# Patient Record
Sex: MALE | Race: WHITE | NOT HISPANIC OR LATINO | Employment: FULL TIME | URBAN - METROPOLITAN AREA
[De-identification: names, ages, dates, MRNs, and addresses within clinical notes are randomized per-mention and may not be internally consistent; named-entity substitution may affect disease eponyms.]

---

## 2019-08-26 ENCOUNTER — HOSPITAL ENCOUNTER (EMERGENCY)
Facility: HOSPITAL | Age: 59
Discharge: HOME | End: 2019-08-26
Attending: EMERGENCY MEDICINE
Payer: MEDICAID

## 2019-08-26 VITALS
WEIGHT: 230 LBS | OXYGEN SATURATION: 98 % | SYSTOLIC BLOOD PRESSURE: 149 MMHG | RESPIRATION RATE: 16 BRPM | HEART RATE: 60 BPM | DIASTOLIC BLOOD PRESSURE: 75 MMHG | TEMPERATURE: 97 F | HEIGHT: 71 IN | BODY MASS INDEX: 32.2 KG/M2

## 2019-08-26 DIAGNOSIS — R07.9 CHEST PAIN, UNSPECIFIED TYPE: Primary | ICD-10-CM

## 2019-08-26 DIAGNOSIS — R07.9 CHEST PAIN: ICD-10-CM

## 2019-08-26 DIAGNOSIS — R42 DIZZINESS: ICD-10-CM

## 2019-08-26 LAB
ALBUMIN SERPL-MCNC: 3.2 G/DL (ref 3.3–5.5)
ALP SERPL-CCNC: 76 U/L (ref 42–141)
BILIRUB SERPL-MCNC: 0.4 MG/DL (ref 0.2–1.6)
BUN SERPL-MCNC: 21 MG/DL (ref 7–22)
CALCIUM SERPL-MCNC: 8.9 MG/DL (ref 8–10.3)
CHLORIDE SERPL-SCNC: 105 MMOL/L (ref 98–108)
CREAT SERPL-MCNC: 1 MG/DL (ref 0.6–1.2)
GLUCOSE SERPL-MCNC: 182 MG/DL (ref 73–118)
POC ALT (SGPT): 23 U/L (ref 10–47)
POC AST (SGOT): 21 U/L (ref 11–38)
POC B-TYPE NATRIURETIC PEPTIDE: 41.6 PG/ML (ref 0–100)
POC CARDIAC TROPONIN I: 0 NG/ML
POC D-DI: 419 NG/ML (ref 0–450)
POC PTINR: 1 (ref 0.9–1.2)
POC PTWBT: 12.2 SEC (ref 9.7–14.3)
POC TCO2: 27 MMOL/L (ref 18–33)
POTASSIUM BLD-SCNC: 4.6 MMOL/L (ref 3.6–5.1)
PROTEIN, POC: 6.8 G/DL (ref 6.4–8.1)
SAMPLE: NORMAL
SAMPLE: NORMAL
SODIUM BLD-SCNC: 142 MMOL/L (ref 128–145)

## 2019-08-26 PROCEDURE — 93005 ELECTROCARDIOGRAM TRACING: CPT | Mod: ER

## 2019-08-26 PROCEDURE — 25500020 PHARM REV CODE 255: Mod: ER

## 2019-08-26 PROCEDURE — 25000003 PHARM REV CODE 250: Mod: ER | Performed by: EMERGENCY MEDICINE

## 2019-08-26 PROCEDURE — 80053 COMPREHEN METABOLIC PANEL: CPT | Mod: ER

## 2019-08-26 PROCEDURE — 85379 FIBRIN DEGRADATION QUANT: CPT | Mod: ER

## 2019-08-26 PROCEDURE — 85610 PROTHROMBIN TIME: CPT | Mod: ER

## 2019-08-26 PROCEDURE — 93010 ELECTROCARDIOGRAM REPORT: CPT | Mod: ,,, | Performed by: INTERNAL MEDICINE

## 2019-08-26 PROCEDURE — 99285 EMERGENCY DEPT VISIT HI MDM: CPT | Mod: 25,ER

## 2019-08-26 PROCEDURE — 83880 ASSAY OF NATRIURETIC PEPTIDE: CPT | Mod: ER

## 2019-08-26 PROCEDURE — 93010 EKG 12-LEAD: ICD-10-PCS | Mod: 76,,, | Performed by: INTERNAL MEDICINE

## 2019-08-26 PROCEDURE — 84484 ASSAY OF TROPONIN QUANT: CPT | Mod: ER

## 2019-08-26 PROCEDURE — 85025 COMPLETE CBC W/AUTO DIFF WBC: CPT | Mod: ER

## 2019-08-26 RX ORDER — ASPIRIN 325 MG
325 TABLET ORAL
Status: COMPLETED | OUTPATIENT
Start: 2019-08-26 | End: 2019-08-26

## 2019-08-26 RX ORDER — ASPIRIN 325 MG
325 TABLET ORAL
Status: DISCONTINUED | OUTPATIENT
Start: 2019-08-26 | End: 2019-08-26

## 2019-08-26 RX ADMIN — ASPIRIN 325 MG ORAL TABLET 325 MG: 325 PILL ORAL at 09:08

## 2019-08-26 RX ADMIN — IOHEXOL 75 ML: 350 INJECTION, SOLUTION INTRAVENOUS at 11:08

## 2019-08-26 NOTE — ED NOTES
Pt. Asking what do we think is going on.  Will ask the doctor to step back into the room to talk with the pt.

## 2019-08-26 NOTE — ED PROVIDER NOTES
"Encounter Date: 8/26/2019    SCRIBE #1 NOTE: I, Hector Richards, am scribing for, and in the presence of,  Dr. Valenzuela. I have scribed the following portions of the note - Other sections scribed: HPI, ROS, PE.       History     Chief Complaint   Patient presents with    Chest Pain     pt c/o chest pain for 3 days getting worse. states radiates to the right shoulder. pt c/o nausea and lightheadedness that gets worse when he moves around.      David Cantu is a 58 year old male with DM and thyroid problems who presents to the ED complaining of chest pain radiating to the right chest for 3 days.  He reports that the pain has been constant and is unrelated to rest or exertion.  He reports shortness of breath with it that was worse when he woke up last night.  He denies any lower extremity edema.  He does report that he recently traveled to [place].  He denies any other history of prolonged immobilization.  He reports that chest pain is not positional.  He denies any history of similar prior symptoms.  Patient also reports bilateral arm numbness that started two days ago. He reports that is not necessarily associated with the chest pain. He denies any focal weakness or dizziness. He does report that he has been having bilateral blurry vision for 3 days and now he is seeing "spots".  Patient also states that he is not having "equilibrium" when standing.  He denies any falls.  Patient took an 81 mg of aspirin today.  He denies any exacerbating or alleviating factors.    The history is provided by the patient. No  was used.     Review of patient's allergies indicates:  No Known Allergies  Past Medical History:   Diagnosis Date    Arthritis     Diabetes mellitus, type 2     Thyroid disease      Past Surgical History:   Procedure Laterality Date    COSMETIC SURGERY      FRACTURE SURGERY      HAND SURGERY      knee surgery       History reviewed. No pertinent family history.  Social History "     Tobacco Use    Smoking status: Current Every Day Smoker     Packs/day: 0.50     Years: 30.00     Pack years: 15.00     Types: Cigarettes   Substance Use Topics    Alcohol use: Yes    Drug use: No     Review of Systems   Constitutional: Negative for chills and fever.   HENT: Negative for congestion, sore throat and trouble swallowing.    Eyes: Positive for visual disturbance.   Respiratory: Negative for cough and shortness of breath.    Cardiovascular: Positive for chest pain. Negative for palpitations and leg swelling.   Gastrointestinal: Negative for abdominal pain, diarrhea, nausea and vomiting.   Musculoskeletal: Negative for back pain and neck pain.   Neurological: Positive for numbness (resolved). Negative for weakness and headaches.   All other systems reviewed and are negative.      Physical Exam     Initial Vitals [08/26/19 0919]   BP Pulse Resp Temp SpO2   (!) 157/83 73 18 98.1 °F (36.7 °C) 100 %      MAP       --         Physical Exam    Nursing note and vitals reviewed.  Constitutional: He appears well-developed and well-nourished.   HENT:   Head: Normocephalic and atraumatic.   Mouth/Throat: Oropharynx is clear and moist.   Eyes: Conjunctivae and EOM are normal. Pupils are equal, round, and reactive to light.   Neck: Normal range of motion. Neck supple.   Cardiovascular: Normal rate, regular rhythm, normal heart sounds and intact distal pulses.   No murmur heard.  Pulmonary/Chest: Breath sounds normal. He has no wheezes.   Abdominal: Soft. Bowel sounds are normal. He exhibits no distension.   Musculoskeletal: Normal range of motion. He exhibits no edema or tenderness.   Neurological: He is alert and oriented to person, place, and time. He has normal strength and normal reflexes. No cranial nerve deficit or sensory deficit. GCS eye subscore is 4. GCS verbal subscore is 5. GCS motor subscore is 6.   Normal finger to nose.   Skin: Skin is warm and dry. Capillary refill takes less than 2 seconds. No  rash noted.   Psychiatric: He has a normal mood and affect. His behavior is normal.         ED Course   Procedures  Labs Reviewed   POCT CMP - Abnormal; Notable for the following components:       Result Value    POC Glucose 182 (*)     All other components within normal limits   TROPONIN ISTAT   POCT CBC   POCT CMP   POCT TROPONIN   POCT B-TYPE NATRIURETIC PEPTIDE (BNP)   POCT PROTIME-INR   POCT D DIMER   ISTAT PROCEDURE   POCT B-TYPE NATRIURETIC PEPTIDE (BNP)   POCT D DIMER     EKG Readings: (Independently Interpreted)   Time: 8/26/19 9:16  Rate: 72 bpm  Normal sinus rhythm. No ST or T-wave abnormalities.  No prior EKGs available to compare to.         Other EKG Interpretations: Time: 8/26/2019 12:16  Rate 55 beats per minute.  Sinus bradycardia.  No ST or T-wave abnormality.  Unchanged from prior EKG.     ECG Results          EKG 12-lead (In process)  Result time 08/26/19 09:27:10    In process by Interface, [facility] (08/26/19 09:27:10)                 Narrative:    Test Reason : R07.9,    Vent. Rate : 072 BPM     Atrial Rate : 072 BPM     P-R Int : 146 ms          QRS Dur : 078 ms      QT Int : 368 ms       P-R-T Axes : 067 069 074 degrees     QTc Int : 402 ms    Normal sinus rhythm  Normal ECG  No previous ECGs available    Referred By:             Confirmed By:                             Imaging Results          CTA Chest Non-Coronary (PE Study) (Final result)  Result time 08/26/19 11:53:17    Final result by Nicole Cordero MD (08/26/19 11:53:17)                 Impression:      No evidence of pulmonary embolus.    Bronchial wall thickening and mild bilateral perihilar lymphadenopathy.  These findings suggest bronchitis.    Three lung nodules ranging in size from 5-6 mm.  For multiple solid nodules with any 6 mm or greater, Fleischner Society guidelines recommend follow up with non-contrast chest CT at 3-6 months and 18-24 months after discovery.    Enlarged thyroid.      Electronically signed  by: Nicole Cordero MD  Date:    08/26/2019  Time:    11:53             Narrative:    EXAMINATION:  CTA CHEST NON CORONARY    CLINICAL HISTORY:  Chest pain, acute, nonspecific, low prob CAD;    TECHNIQUE:  Low dose axial images, sagittal and coronal reformations were obtained from the thoracic inlet to the lung bases following the IV administration of 75 mL of Omnipaque 350.  Contrast timing was optimized to evaluate the pulmonary arteries.  MIP images were performed.    COMPARISON:  Chest PA and lateral same date    FINDINGS:  The pulmonary artery and its branches are patent and unremarkable.  The thoracic aorta is normal in caliber and unremarkable.  The heart is unremarkable.  No pericardial fluid.    No mediastinal lymphadenopathy.  There is mild bilateral perihilar lymphadenopathy.  There is thickening of the perihilar bronchial walls.    There are 2 nodules in the lower lobe of the left lung.  One nodule measures 6 mm and the other measures 5 mm.  There is a 6 mm nodule in the lower lobe of the right lung.  The bilateral lungs are otherwise unremarkable.  No pneumothorax or pleural fluid.    The visualized abdominal structures are unremarkable.  There is degenerative change of the spine.  The osseous structures are otherwise unremarkable.    The thyroid is enlarged.                               CT Head Without Contrast (Final result)  Result time 08/26/19 10:15:59    Final result by Og Alex MD (08/26/19 10:15:59)                 Impression:      No acute abnormality.  Follow-up as clinically indicated.      Electronically signed by: Og Alex MD  Date:    08/26/2019  Time:    10:15             Narrative:    EXAMINATION:  CT HEAD WITHOUT CONTRAST    CLINICAL HISTORY:  Dizziness;    TECHNIQUE:  Low dose axial CT images obtained throughout the head without intravenous contrast. Sagittal and coronal reconstructions were performed.    COMPARISON:  None.    FINDINGS:  Intracranial  compartment:    Ventricles and sulci are normal in size for age without evidence of hydrocephalus. No extra-axial blood or fluid collections.    The brain parenchyma appears normal. No parenchymal mass, hemorrhage, edema or major vascular distribution infarct.    Skull/extracranial contents (limited evaluation): No fracture. Mastoid air cells and paranasal sinuses are essentially clear.                                X-Ray Chest PA And Lateral (Final result)  Result time 08/26/19 10:15:16    Final result by Petr Mcgee MD (08/26/19 10:15:16)                 Impression:      Mild pleural thickening along the lateral margin of the right chest could represent a small amount of pleural fluid or pleural based process.  Follow-up radiograph recommended to ensure resolution.    This report was flagged in Epic as abnormal.      Electronically signed by: Petr Mcgee MD  Date:    08/26/2019  Time:    10:15             Narrative:    EXAMINATION:  XR CHEST PA AND LATERAL    CLINICAL HISTORY:  Chest pain, unspecified    TECHNIQUE:  PA and lateral views of the chest were performed.    COMPARISON:  None    FINDINGS:  Mild pleural thickening along the lateral margin of the right chest.  Otherwise,the lungs are clear, with normal appearance of pulmonary vasculature and no pleural effusion or pneumothorax.    The cardiac silhouette is normal in size. The hilar and mediastinal contours are unremarkable.    Bones are intact.                                 Medical Decision Making:   History:   Old Medical Records: I decided to obtain old medical records.  Initial Assessment:   This is a 58-year-old male with history of diabetes and hypothyroidism who comes in complaining of 3 days of constant right-sided chest pain, shortness of breath, dizziness, arm numbness and blurry vision.  Patient on examination has stable vitals.  He was initially hypertensive but blood pressure improved.  On physical exam he is alert and oriented.  He is  neurologically intact. He has no reproducible tenderness to palpation.  Exam is unremarkable otherwise.  Orders included EKG, CBC, CMP, troponin, BMP, D-dimer, chest x-ray, head CT.  He was given a full-dose aspirin.  Differential Diagnosis:   ACS, arrhythmia, aortic dissection, PE, pneumonia, pneumothorax, pericarditis, myocarditis, TIA, CVA, intracranial hemorrhage, intracranial mass, occult infection, electrolyte abnormality.  Independently Interpreted Test(s):   I have ordered and independently interpreted X-rays - see summary below.       <> Summary of X-Ray Reading(s): Chest x-ray was reviewed showed a right-sided pleural thickening.      Head CT was reviewed showed no acute intracranial hemorrhage or evidence of acute ischemia.    CT angiogram of the chest was reviewed and showed no evidence of PE.  I have ordered and independently interpreted EKG Reading(s) - see prior notes  Clinical Tests:   Lab Tests: Ordered and Reviewed  The following lab test(s) were unremarkable: CBC, CMP, PT, D-Dimer, Troponin and BNP       <> Summary of Lab: Labs were reviewed and were significant for an positive D-dimer.  Troponin BNP were normal.  Radiological Study: Ordered and Reviewed  ED Management:  Patient's workup was unremarkable.  His initial D-dimer was elevated and that setting he had a CT angiogram of the chest.  It showed no evidence of PE.  He did have some pleural-based thickening as well as a pulmonary nodule that will require monitoring.  Patient's only ACS risk factors are diabetes and tobacco use.  His HEART score is 3.  His cardiac enzymes are negative despite him having constant chest pain for 3 days.  His head CT is unremarkable. He is ambulating in the ER with no difficulty.  He has no evidence of any neurologic deficits.  He does report that he stop taking his Ambien and some of his other meds for the past 4 days prior to the onset of symptoms as he had gone to [place].  Certainly this could be contributing  to his generalized fatigue.  He will require further lab testing including thyroid levels as well as a stress test.  He ready has an appointment scheduled with his PCP for 2 days from now.  I discussed with him the option of admission for further ACS rule out but in light of his heart score as well as his negative workup in the ER patient is stable for discharge. He will follow up closely outpatient and return to the ER for any concerns.  He is comfortable with that discharge plan as is his wife.            Scribe Attestation:   Scribe #1: I performed the above scribed service and the documentation accurately describes the services I performed. I attest to the accuracy of the note.    Attending Attestation:           Physician Attestation for Scribe:  Physician Attestation Statement for Scribe #1: I, Yessenia Valenzuela, reviewed documentation, as scribed by Hector Richards in my presence, and it is both accurate and complete.                    Clinical Impression:     1. Chest pain, unspecified type    2. Chest pain    3. Dizziness            Disposition:   Disposition: Discharged  Condition: Stable                        Yessenia Valenzuela MD  08/26/19 0107

## 2021-03-05 ENCOUNTER — IMMUNIZATION (OUTPATIENT)
Dept: PRIMARY CARE CLINIC | Facility: CLINIC | Age: 61
End: 2021-03-05
Payer: MEDICAID

## 2021-03-05 DIAGNOSIS — Z23 NEED FOR VACCINATION: Primary | ICD-10-CM

## 2021-03-05 PROCEDURE — 91303 PR SARSCOV2 VAC AD26 .5ML IM: ICD-10-PCS | Mod: S$GLB,,, | Performed by: INTERNAL MEDICINE

## 2021-03-05 PROCEDURE — 0031A PR IMMUNIZ ADMIN, SARS-COV-2 COVID-19 VACC, 5X10VP/0.5ML: CPT | Mod: CV19,S$GLB,, | Performed by: INTERNAL MEDICINE

## 2021-03-05 PROCEDURE — 91303 PR SARSCOV2 VAC AD26 .5ML IM: CPT | Mod: S$GLB,,, | Performed by: INTERNAL MEDICINE

## 2021-03-05 PROCEDURE — 0031A PR IMMUNIZ ADMIN, SARS-COV-2 COVID-19 VACC, 5X10VP/0.5ML: ICD-10-PCS | Mod: CV19,S$GLB,, | Performed by: INTERNAL MEDICINE

## 2022-09-05 ENCOUNTER — HOSPITAL ENCOUNTER (EMERGENCY)
Facility: HOSPITAL | Age: 62
Discharge: HOME | End: 2022-09-05
Attending: EMERGENCY MEDICINE
Payer: MEDICAID

## 2022-09-05 VITALS
RESPIRATION RATE: 18 BRPM | HEART RATE: 62 BPM | TEMPERATURE: 98 F | WEIGHT: 230 LBS | DIASTOLIC BLOOD PRESSURE: 71 MMHG | OXYGEN SATURATION: 100 % | HEIGHT: 71 IN | BODY MASS INDEX: 32.2 KG/M2 | SYSTOLIC BLOOD PRESSURE: 121 MMHG

## 2022-09-05 DIAGNOSIS — M70.22 OLECRANON BURSITIS OF LEFT ELBOW: Primary | ICD-10-CM

## 2022-09-05 PROCEDURE — 99284 EMERGENCY DEPT VISIT MOD MDM: CPT | Mod: 25,ER

## 2022-09-05 RX ORDER — KETOROLAC TROMETHAMINE 10 MG/1
TABLET, FILM COATED ORAL
Qty: 15 TABLET | Refills: 0 | Status: SHIPPED | OUTPATIENT
Start: 2022-09-05

## 2022-09-05 RX ORDER — SULFAMETHOXAZOLE AND TRIMETHOPRIM 800; 160 MG/1; MG/1
1 TABLET ORAL 2 TIMES DAILY
Qty: 14 TABLET | Refills: 0 | Status: SHIPPED | OUTPATIENT
Start: 2022-09-05 | End: 2022-10-18 | Stop reason: SDUPTHER

## 2022-09-05 NOTE — ED NOTES
Large arm sling applied to left arm. Rx and d/c info given to pt. Pt states understanding to stop taking motrin while he is taking the toradol and to follow up with ortho as soon as possible.   comprehensive exam...

## 2022-09-05 NOTE — ED NOTES
Pt has swelling, redness and tenderness to left elbow. Denies injury. States he thinks he will need to have fluid removed from his left elbow

## 2022-09-05 NOTE — ED PROVIDER NOTES
Encounter Date: 9/5/2022    SCRIBE #1 NOTE: I, Julisa Galvan, am scribing for, and in the presence of,  Dyan Chavez MD. I have scribed the following portions of the note - Other sections scribed: HPI; ROS; PE.     History     Chief Complaint   Patient presents with    Joint Swelling     Swelling to left elbow, onset 3-4 days, denies injury     David Cantu is a 61 y.o. male with Hx of DM and Thyroid Disease who presents to the ED for chief complaint of left elbow pain, swelling, and warmth onset 3 days ago. Patient reports the swelling has started radiating into the upper and lower arm. He states he does not frequently apply pressure to elbow in his day-to-day activities. Patient has not attempted treatment at home. He denies associated symptoms of fever, chills, nausea, or vomiting. Patient uses tobacco.       The history is provided by the patient. No  was used.   Review of patient's allergies indicates:  No Known Allergies  Past Medical History:   Diagnosis Date    Arthritis     Diabetes mellitus, type 2     Thyroid disease      Past Surgical History:   Procedure Laterality Date    COSMETIC SURGERY      FRACTURE SURGERY      HAND SURGERY      knee surgery       History reviewed. No pertinent family history.  Social History     Tobacco Use    Smoking status: Every Day     Packs/day: 0.50     Years: 30.00     Pack years: 15.00     Types: Cigarettes   Substance Use Topics    Alcohol use: Yes    Drug use: No     Review of Systems   Constitutional:  Negative for activity change, appetite change, chills and fever.   HENT:  Negative for congestion, rhinorrhea, sneezing and sore throat.    Respiratory:  Negative for cough, chest tightness, shortness of breath and wheezing.    Cardiovascular:  Negative for chest pain and palpitations.   Gastrointestinal:  Negative for abdominal pain, diarrhea, nausea and vomiting.   Musculoskeletal:  Positive for arthralgias (left elbow) and joint  swelling (left elbow).   Skin:  Negative for pallor, rash and wound.   Neurological:  Negative for dizziness, syncope, light-headedness and headaches.   All other systems reviewed and are negative.    Physical Exam     Initial Vitals [09/05/22 0720]   BP Pulse Resp Temp SpO2   119/68 68 19 98 °F (36.7 °C) 98 %      MAP       --         Physical Exam    Nursing note and vitals reviewed.  Constitutional: He appears well-developed and well-nourished. No distress.   HENT:   Head: Normocephalic and atraumatic.   Eyes: Conjunctivae are normal.   Neck:   Normal range of motion.  Cardiovascular:  Normal rate and regular rhythm.           No murmur heard.  Pulmonary/Chest: Breath sounds normal. No respiratory distress.   Abdominal: Bowel sounds are normal. He exhibits no distension. There is no abdominal tenderness.   Musculoskeletal:         General: No tenderness or edema. Normal range of motion.      Left elbow: Swelling present. Normal range of motion.      Cervical back: Normal range of motion.      Comments: Left elbow warm and swollen bursa with slightly limited ROM at extreme flexion and extension only.      Lymphadenopathy:        Left: No epitrochlear adenopathy present.   Neurological: He is alert and oriented to person, place, and time.   Skin: Skin is warm and dry. No rash noted.   No breaks in skin over elbow   Psychiatric: He has a normal mood and affect. His behavior is normal.       ED Course   Procedures  Labs Reviewed - No data to display       Imaging Results    None          Medications - No data to display  Medical Decision Making:   History:   Old Medical Records: I decided to obtain old medical records.  ED Management:  Exam is consistent with olecranon bursitis.  Treat with NSAIDs, ice, ACE wrap.  Will cover with bactrim DS since diabetic.  Follow up with Ortho.        Scribe Attestation:   Scribe #1: I performed the above scribed service and the documentation accurately describes the services I  performed. I attest to the accuracy of the note.             Scribe attestation: I, Dyan Chavez MD, personally performed the services described in this documentation.  All medical record entries made by the scribe were at my direction and in my presence.  I have reviewed the chart and agree that the record reflects my personal performance and is accurate and complete.    Clinical Impression:   Final diagnoses:  [M70.22] Olecranon bursitis of left elbow (Primary)      ED Disposition Condition    Discharge Stable          ED Prescriptions       Medication Sig Dispense Start Date End Date Auth. Provider    ketorolac (TORADOL) 10 mg tablet Take 1 tablet 3 times daily after meals 15 tablet 9/5/2022 -- Dyan Chavez MD    sulfamethoxazole-trimethoprim 800-160mg (BACTRIM DS) 800-160 mg Tab Take 1 tablet by mouth 2 (two) times daily. for 7 days 14 tablet 9/5/2022 9/12/2022 Dyan Chavez MD          Follow-up Information    None          Dyan Chavez MD  09/05/22 5740

## 2022-09-05 NOTE — DISCHARGE INSTRUCTIONS
Stop taking motrin.  Take toradol instead.  Take all of the prescribed bactrim.  You should apply ice to the elbow 3-4 times a day.  Follow up with an Orthopedic doctor as soon as possible.

## 2023-03-30 ENCOUNTER — OFFICE VISIT (OUTPATIENT)
Dept: URGENT CARE | Facility: CLINIC | Age: 63
End: 2023-03-30
Payer: MEDICAID

## 2023-03-30 VITALS
HEIGHT: 71 IN | SYSTOLIC BLOOD PRESSURE: 144 MMHG | HEART RATE: 84 BPM | BODY MASS INDEX: 32.48 KG/M2 | TEMPERATURE: 99 F | RESPIRATION RATE: 16 BRPM | WEIGHT: 232 LBS | OXYGEN SATURATION: 96 % | DIASTOLIC BLOOD PRESSURE: 73 MMHG

## 2023-03-30 DIAGNOSIS — M70.22 OLECRANON BURSITIS OF LEFT ELBOW: Primary | ICD-10-CM

## 2023-03-30 PROCEDURE — 99203 PR OFFICE/OUTPT VISIT, NEW, LEVL III, 30-44 MIN: ICD-10-PCS | Mod: S$GLB,,,

## 2023-03-30 PROCEDURE — 73080 XR ELBOW COMPLETE 3 VIEW LEFT: ICD-10-PCS | Mod: LT,S$GLB,, | Performed by: RADIOLOGY

## 2023-03-30 PROCEDURE — 99203 OFFICE O/P NEW LOW 30 MIN: CPT | Mod: S$GLB,,,

## 2023-03-30 PROCEDURE — 73080 X-RAY EXAM OF ELBOW: CPT | Mod: LT,S$GLB,, | Performed by: RADIOLOGY

## 2023-03-30 RX ORDER — LEVOTHYROXINE SODIUM 112 UG/1
112 TABLET ORAL
COMMUNITY

## 2023-03-30 RX ORDER — KETOROLAC TROMETHAMINE 30 MG/ML
30 INJECTION, SOLUTION INTRAMUSCULAR; INTRAVENOUS
Status: COMPLETED | OUTPATIENT
Start: 2023-03-30 | End: 2023-03-30

## 2023-03-30 RX ADMIN — KETOROLAC TROMETHAMINE 30 MG: 30 INJECTION, SOLUTION INTRAMUSCULAR; INTRAVENOUS at 03:03

## 2023-03-30 NOTE — PROGRESS NOTES
"Subjective:      Patient ID: David Cantu is a 62 y.o. male.    Vitals:  height is 5' 11" (1.803 m) and weight is 105.2 kg (232 lb). His oral temperature is 98.5 °F (36.9 °C). His blood pressure is 144/73 (abnormal) and his pulse is 84. His respiration is 16 and oxygen saturation is 96%.     Chief Complaint: Joint Swelling    Patient is a 62-year-old male with history of type 2 diabetes mellitus, olecranon bursitis, carpal tunnel syndrome, who presents with left elbow redness, swelling, pain for the past 6-7 months.  States that he was seen in the ED initially for left olecranon bursitis, but it has never completely resolved and since then has been slowly worsening.  He was seen by his PCP on 3/14/23 started on Bactrim.  Other prescription for Bactrim was sent today by his PCP.  He states that today, the area had burst open and was draining bloody and yellow fluid.  States that occasionally his left hand will get numbness and tingling.  He has been taking Norco for the pain.  States that referral was also placed red [facility] or throat, but he has been trouble getting an appointment.  Denies any fever, chills, myalgias, limited range of motion of elbow, red streaking.    Elbow Injury  The current episode started more than 1 month ago. Associated symptoms include arthralgias, joint swelling and numbness. Pertinent negatives include no abdominal pain, chest pain, chills, coughing, fever, headaches, myalgias, nausea, neck pain, rash or vomiting. Exacerbated by: movement.     Constitution: Negative for chills and fever.   Neck: Negative for neck pain.   Cardiovascular:  Negative for chest pain.   Eyes:  Negative for eye discharge and eye itching.   Respiratory:  Negative for cough and shortness of breath.    Gastrointestinal:  Negative for abdominal pain, nausea, vomiting and diarrhea.   Musculoskeletal:  Positive for joint pain and joint swelling. Negative for abnormal ROM of joint and muscle ache.   Skin:  " Patient c/o cough, sore throat, rib pain and nasal congestion. Afebrile.   Positive for erythema. Negative for rash.   Neurological:  Positive for numbness and tingling. Negative for dizziness, light-headedness and headaches.    Objective:     Physical Exam   Constitutional: He is oriented to person, place, and time. He appears well-developed.   HENT:   Head: Normocephalic and atraumatic. Head is without abrasion, without contusion and without laceration.   Ears:   Right Ear: External ear normal.   Left Ear: External ear normal.   Nose: Nose normal.   Mouth/Throat: Oropharynx is clear and moist and mucous membranes are normal.   Eyes: Conjunctivae, EOM and lids are normal. Pupils are equal, round, and reactive to light.   Neck: Trachea normal and phonation normal. Neck supple.   Cardiovascular: Normal rate, regular rhythm and normal heart sounds.   Pulmonary/Chest: Effort normal and breath sounds normal. No stridor. No respiratory distress.   Musculoskeletal: Normal range of motion.         General: Normal range of motion.      Comments: Erythema, swelling, tenderness over olecranon process.  The area is draining serosanguineous fluid.  No red streaking.  Full range of motion of left elbow with flexion, extension, supination, pronation.  Neurovascularly intact distally.   Neurological: He is alert and oriented to person, place, and time.   Skin: Skin is warm, dry, intact and no rash. Capillary refill takes less than 2 seconds. erythema No abrasion, No burn, No bruising and No ecchymosis   Psychiatric: His speech is normal and behavior is normal. Judgment and thought content normal.   Nursing note and vitals reviewed.    X-Ray Elbow Complete 3 view Left    Result Date: 3/30/2023  EXAMINATION: XR ELBOW COMPLETE 3 VIEW LEFT CLINICAL HISTORY: Olecranon bursitis, left elbow TECHNIQUE: AP, lateral, and oblique views of the left elbow were performed. COMPARISON: None FINDINGS: Soft tissue swelling overlying the olecranon, suggestive of bursitis.  The joint alignment is within normal limits.  No  fracture.  No marrow replacement process.  The elbow fat pads are unremarkable.  Probable enthesopathy at the common flexor tendon origin.     No fracture identified. Electronically signed by: Derian Schultz MD Date:    03/30/2023 Time:    16:18       Assessment:     1. Olecranon bursitis of left elbow        Plan:       Olecranon bursitis of left elbow  -     ketorolac injection 30 mg  -     X-Ray Elbow Complete 3 view Left; Future; Expected date: 03/30/2023  -     Ambulatory referral/consult to Orthopedics      Patient is a 62-year-old male who presents with left elbow redness, swelling, pain for the past half year.  Worsening within the past few weeks.  Today, the area burst open that started draining.  Vital signs are within normal limits.  On exam, patient is nontoxic and afebrile.  Erythema, tenderness, swelling over olecranon process.  Patient has full range of motion of his left elbow.  Neurovascularly intact distally.  No red streaking.  Discussed XR with pt. Advised patient to  the Bactrim prescription and begin taking it today.  Have discussed with patient strict ED precautions should symptoms worsen.  I have placed a referral for orthopedics.  Earliest appointment is May 19.  I have also provided resources for orthopedic offices that accept Medicaid.  Patient verbalizes understanding and agrees with plan.            Patient Instructions     [facility] is committed to your overall health. Here's a list of external facilities you can contact should you need sooner availabilities    [facility] [phone] [address], [address]th [place]  [address]     [facility] Ortho Clinic - Anything other than HAND  [phone] [address], [address]th [place]  [address]    [facility]  [phone]  Dr. Spike Morris [address] [place]  [address]    [facility]  [phone] [address] [place]  [address]    Vic Jackson Jr., MD   [phone] [address] [facility] [address] [place]  [address] Secondary Medicaid Only       Take Tylenol or ibuprofen for pain if not allergic to.     Rest, ice, compress at home     Avoid prolonged use of the affected area until better.      Please return or see your primary care doctor if you develop new or worsening symptoms.      You must understand that you've received an Urgent Care treatment only and that you may be released before all of your medical problems are known or treated. You, the patient, will arrange for follow up as instructed. If your symptoms worsen or fail to improve you should go to the Emergency Room.     If you are give a referral to specialist, please conform your appointment by calling [phone].

## 2024-01-02 ENCOUNTER — HOSPITAL ENCOUNTER (EMERGENCY)
Facility: HOSPITAL | Age: 64
Discharge: HOME | End: 2024-01-02
Attending: EMERGENCY MEDICINE
Payer: MEDICAID

## 2024-01-02 VITALS
OXYGEN SATURATION: 98 % | TEMPERATURE: 98 F | SYSTOLIC BLOOD PRESSURE: 159 MMHG | WEIGHT: 235 LBS | RESPIRATION RATE: 20 BRPM | BODY MASS INDEX: 32.78 KG/M2 | DIASTOLIC BLOOD PRESSURE: 95 MMHG | HEART RATE: 85 BPM

## 2024-01-02 DIAGNOSIS — M25.511 RIGHT SHOULDER PAIN: ICD-10-CM

## 2024-01-02 DIAGNOSIS — M25.511 ACUTE PAIN OF RIGHT SHOULDER: ICD-10-CM

## 2024-01-02 DIAGNOSIS — M19.011 OSTEOARTHRITIS OF RIGHT SHOULDER, UNSPECIFIED OSTEOARTHRITIS TYPE: Primary | ICD-10-CM

## 2024-01-02 PROCEDURE — 63600175 PHARM REV CODE 636 W HCPCS: Mod: JZ,JG,ER

## 2024-01-02 PROCEDURE — 25000003 PHARM REV CODE 250: Mod: ER

## 2024-01-02 PROCEDURE — 93010 ELECTROCARDIOGRAM REPORT: CPT | Mod: ,,, | Performed by: INTERNAL MEDICINE

## 2024-01-02 PROCEDURE — 93005 ELECTROCARDIOGRAM TRACING: CPT | Mod: ER

## 2024-01-02 PROCEDURE — 96372 THER/PROPH/DIAG INJ SC/IM: CPT

## 2024-01-02 PROCEDURE — 99284 EMERGENCY DEPT VISIT MOD MDM: CPT | Mod: ER

## 2024-01-02 RX ORDER — MORPHINE SULFATE 4 MG/ML
6 INJECTION, SOLUTION INTRAMUSCULAR; INTRAVENOUS
Status: COMPLETED | OUTPATIENT
Start: 2024-01-02 | End: 2024-01-02

## 2024-01-02 RX ORDER — LIDOCAINE 50 MG/G
1 PATCH TOPICAL
Status: DISCONTINUED | OUTPATIENT
Start: 2024-01-02 | End: 2024-01-02 | Stop reason: HOSPADM

## 2024-01-02 RX ORDER — LIDOCAINE 50 MG/G
1 PATCH TOPICAL DAILY
Qty: 15 PATCH | Refills: 0 | Status: SHIPPED | OUTPATIENT
Start: 2024-01-02 | End: 2024-01-23 | Stop reason: SDUPTHER

## 2024-01-02 RX ORDER — ORPHENADRINE CITRATE 100 MG/1
100 TABLET, EXTENDED RELEASE ORAL 2 TIMES DAILY PRN
Qty: 15 TABLET | Refills: 0 | Status: SHIPPED | OUTPATIENT
Start: 2024-01-02

## 2024-01-02 RX ADMIN — MORPHINE SULFATE 6 MG: 4 INJECTION, SOLUTION INTRAMUSCULAR; INTRAVENOUS at 05:01

## 2024-01-02 RX ADMIN — LIDOCAINE 5% 1 PATCH: 700 PATCH TOPICAL at 05:01

## 2024-01-02 NOTE — DISCHARGE INSTRUCTIONS

## 2024-01-02 NOTE — ED PROVIDER NOTES
Encounter Date: 1/2/2024    SCRIBE #1 NOTE: I, Nikki Yuen, am scribing for, and in the presence of,  Holdsworth, Alayna, PA-C. I have scribed the following portions of the note - Other sections scribed: HPI, DOTTIE.       History     Chief Complaint   Patient presents with    Shoulder Pain     Pt reports he began with right shoulder pain and arm pain this am        63-year-old male with a past medical history of arthritis, diabetes mellitus type 2, and thyroid disease presents to the ED with right shoulder pain for 1 day. Patient states he was cleaning up after [employer] and woke up the next morning with pain to his right shoulder and arm. Patient states he was playing with his grand kids and very active for [employer]. States pain has only been worsening and describes it as sharp 10/10. Reports painful limited range of motion of right shoulder/arm that is exacerbated with movement. Notes slight tingling down his right arm and swelling to right shoulder jamshid. Patient also states pain now radiates to the right side of his neck. Denies taking any medication to treat his symptoms, but daily medicated with 7.5 mg of HYDROcodone-acetaminophen. Denies any falls or trauma. Denies any color changes, numbness, shortness or breath, chest pain, or associated symptoms.         The history is provided by the patient. No  was used.     Review of patient's allergies indicates:  No Known Allergies  Past Medical History:   Diagnosis Date    Arthritis     Diabetes mellitus, type 2     Thyroid disease      Past Surgical History:   Procedure Laterality Date    COSMETIC SURGERY      FRACTURE SURGERY      HAND SURGERY      knee surgery       History reviewed. No pertinent family history.  Social History     Tobacco Use    Smoking status: Every Day     Current packs/day: 0.50     Average packs/day: 0.5 packs/day for 30.0 years (15.0 ttl pk-yrs)     Types: Cigarettes   Substance Use Topics    Alcohol use: Yes    Drug use:  No     Review of Systems   Constitutional:  Negative for fever.        (-) falls/trauma   Respiratory:  Negative for shortness of breath.    Cardiovascular:  Negative for chest pain.   Genitourinary:  Negative for frequency.   Musculoskeletal:  Positive for arthralgias (R shoulder), myalgias (R shoulder blade, R arm) and neck pain (R).        (+) swelling to R shoulder blade   Skin:  Negative for color change.   Neurological:  Negative for dizziness, weakness, light-headedness, numbness and headaches.        (+) tingling R arm   All other systems reviewed and are negative.      Physical Exam     Initial Vitals [01/02/24 1255]   BP Pulse Resp Temp SpO2   (!) 165/98 87 20 97.8 °F (36.6 °C) 98 %      MAP       --         Physical Exam    Nursing note and vitals reviewed.  Constitutional: He appears well-developed and well-nourished. He is not diaphoretic. He is active. He does not appear ill. No distress.   HENT:   Head: Normocephalic and atraumatic.   Right Ear: External ear normal.   Left Ear: External ear normal.   Nose: Nose normal.   Eyes: Conjunctivae, EOM and lids are normal. Pupils are equal, round, and reactive to light. Right eye exhibits no discharge. Left eye exhibits no discharge. No scleral icterus.   Neck: Phonation normal. Neck supple.   Normal range of motion.  Cardiovascular:  Normal rate and regular rhythm.           Pulses:       Radial pulses are 2+ on the right side.   Pulmonary/Chest: Effort normal and breath sounds normal. No respiratory distress.   Abdominal: He exhibits no distension.   Musculoskeletal:      Right shoulder: Bony tenderness (right posterior) present. No swelling, deformity or effusion. Decreased range of motion (painful abduction).      Right upper arm: Normal.      Cervical back: Normal range of motion and neck supple. No swelling, edema, deformity, erythema, rigidity or bony tenderness. Pain with movement and muscular tenderness (right) present. No spinous process tenderness.  Normal range of motion.      Thoracic back: Normal.      Lumbar back: Normal.     Neurological: He is alert and oriented to person, place, and time. He has normal strength. No sensory deficit. GCS eye subscore is 4. GCS verbal subscore is 5. GCS motor subscore is 6.   Skin: Skin is dry. Capillary refill takes less than 2 seconds.         ED Course   Procedures  Labs Reviewed - No data to display  EKG Readings: (Independently Interpreted)   EKG showed normal sinus rhythm with a rate of 82 beats per minute.  ND interval 148 milliseconds.  QRS of 86 milliseconds.  QTC of 411 milliseconds.  Occasional PVCs.  Otherwise normal EKG.  No STEMI noted.  Signed by Dr. Hassan.       Imaging Results              X-Ray Shoulder Complete 2 View Right (Final result)  Result time 01/02/24 15:40:57      Final result by Prince Levin MD (01/02/24 15:40:57)                   Impression:      1.  No evidence of a fracture or dislocation of the right shoulder.    2.  Mild osteoarthrosis of the right shoulder.      Electronically signed by: Prince Levin MD  Date:    01/02/2024  Time:    15:40               Narrative:    EXAMINATION:  XR SHOULDER COMPLETE 2 OR MORE VIEWS RIGHT    CLINICAL HISTORY:  Pain in unspecified shoulder    TECHNIQUE:  Two or three views of the right shoulder were performed.    COMPARISON:  None    FINDINGS:  The bone mineralization is within normal limits.  There is no cortical step-off.  There is no evidence of periostitis.    The glenohumeral articulation is maintained.  There is mild arthropathy of the acromioclavicular joint.  The coracoclavicular interval is within normal limits.    The visualized right hemithorax unremarkable.  There is no evidence of a pneumothorax or pulmonary contusion.    There is no evidence of a fracture or dislocation.                                       Medications   morphine injection 6 mg (6 mg Intramuscular Given 1/2/24 1720)     Medical Decision Making  63-year-old male with a  past medical history of arthritis, diabetes mellitus type 2, and thyroid disease presents to the ED with right shoulder pain for 1 day.   Patient's chart and medical history reviewed.    Ddx:  Fracture  Dislocation  Contusion  Sprain  OA    Patient's vitals reviewed.  Afebrile, no respiratory distress, and nontoxic-appearing in the ED. patient had right posterior shoulder and right muscle neck tenderness to palpation.  2+ radial pulses.  Normal sensation.  No erythema, warmth, or swelling appreciated.  Patient had painful and decreased range of motion of the right shoulder with abduction.  Patient given a lidocaine patch and morphine for pain.  EKG showed normal sinus rhythm with a rate of 82 beats per minute.  TX interval 148 milliseconds.  QRS of 86 milliseconds.  QTC of 411 milliseconds.  Occasional PVCs.  Otherwise normal EKG.  No STEMI noted.  Signed by Dr. Hassan. Shoulder x-ray was unremarkable per my personal interpretation. Official x-ray interpretation showed No evidence of a fracture or dislocation of the right shoulder. 2. Mild osteoarthrosis of the right shoulder.  Discussed results with patient, he verbalized understanding.  Patient states he already has a orthopedic doctor.  Patient will follow-up.  Patient will be sent home with Norflex and lidocaine patches for symptomatic control.  Patient has CKD, can not sent home any anti-inflammatories.  Discussed with him that he must leave patch on for 12 hours then leave off for 12 hours, he verbalized understanding. Patient agrees with this plan. Discussed with him strict return precautions, he verbalized understanding. Patient is stable for discharge.       Amount and/or Complexity of Data Reviewed  External Data Reviewed: labs.  Radiology: ordered.  ECG/medicine tests: ordered.    Risk  Prescription drug management.            Scribe Attestation:   Scribe #1: I performed the above scribed service and the documentation accurately describes the services I  performed. I attest to the accuracy of the note.                             I, Alayna Holdsworth,PA-C, personally performed the services described in this documentation. All medical record entries made by the scribe were at my direction and in my presence. I have reviewed the chart and agree that the record reflects my personal performance and is accurate and complete.    Clinical Impression:  Final diagnoses:  [M25.511] Acute pain of right shoulder  [M19.011] Osteoarthritis of right shoulder, unspecified osteoarthritis type (Primary)  [M25.511] Right shoulder pain          ED Disposition Condition    Discharge Stable          ED Prescriptions       Medication Sig Dispense Start Date End Date Auth. Provider    LIDOcaine (LIDODERM) 5 % Place 1 patch onto the skin once daily. Remove & Discard patch within 12 hours then leave off for 12 hours 15 patch 1/2/2024 -- Holdsworth, Alayna, PA-C    orphenadrine (NORFLEX) 100 mg tablet Take 1 tablet (100 mg total) by mouth 2 (two) times daily as needed for Muscle spasms or Pain. 15 tablet 1/2/2024 -- Holdsworth, Alayna, PA-C          Follow-up Information       Follow up With Specialties Details Why Contact Info    Your Orthopedic doctor                 Holdsworth, Alayna, PA-C  01/02/24 2026

## 2024-05-06 NOTE — PATIENT INSTRUCTIONS
[facility] is committed to your overall health. Here's a list of external facilities you can contact should you need sooner availabilities    [facility] [phone] [address], [address] [place]  [address]     [facility] Ortho Clinic - Anything other than HAND  [phone] [address], [address] [place]  [address]    [facility]  [phone]  Dr. Spike Morris [address] [place]  [address]    [facility]  [phone] [address] [place]  [address]    Vic Jackson Jr., MD  [phone] [address] [place], [place]  [address] [facility] Medicaid Only       Take Tylenol or ibuprofen for pain if not allergic to.     Rest, ice, compress at home     Avoid prolonged use of the affected area until better.      Please return or see your primary care doctor if you develop new or worsening symptoms.      You must understand that you've received an Urgent Care treatment only and that you may be released before all of your medical problems are known or treated. You, the patient, will arrange for follow up as instructed. If your symptoms worsen or fail to improve you should go to the Emergency Room.     If you are give a referral to specialist, please conform your appointment by calling [phone].    
[email or web address]